# Patient Record
Sex: FEMALE
[De-identification: names, ages, dates, MRNs, and addresses within clinical notes are randomized per-mention and may not be internally consistent; named-entity substitution may affect disease eponyms.]

---
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
## 2024-01-21 PROBLEM — Z23 NEED FOR VACCINATION: Status: RESOLVED | Noted: 2018-07-24 | Resolved: 2024-01-21

## 2024-01-21 PROBLEM — Z23 ENCOUNTER FOR IMMUNIZATION: Status: ACTIVE | Noted: 2024-01-21

## 2024-01-21 PROBLEM — Z00.00 ENCOUNTER FOR PREVENTIVE HEALTH EXAMINATION: Status: ACTIVE | Noted: 2018-06-24

## 2024-01-21 RX ORDER — PROMETHAZINE HYDROCHLORIDE AND DEXTROMETHORPHAN HYDROBROMIDE ORAL SOLUTION 15; 6.25 MG/5ML; MG/5ML
6.25-15 SOLUTION ORAL EVERY 6 HOURS
Qty: 120 | Refills: 0 | Status: COMPLETED | COMMUNITY
Start: 2021-07-14 | End: 2024-01-21

## 2024-01-24 ENCOUNTER — APPOINTMENT (OUTPATIENT)
Dept: PEDIATRICS | Facility: CLINIC | Age: 19
End: 2024-01-24
Payer: COMMERCIAL

## 2024-01-24 VITALS
WEIGHT: 105.5 LBS | SYSTOLIC BLOOD PRESSURE: 118 MMHG | BODY MASS INDEX: 18.46 KG/M2 | HEIGHT: 63.5 IN | DIASTOLIC BLOOD PRESSURE: 80 MMHG

## 2024-01-24 DIAGNOSIS — Z23 ENCOUNTER FOR IMMUNIZATION: ICD-10-CM

## 2024-01-24 DIAGNOSIS — Z00.00 ENCOUNTER FOR GENERAL ADULT MEDICAL EXAMINATION W/OUT ABNORMAL FINDINGS: ICD-10-CM

## 2024-01-24 DIAGNOSIS — Z13.220 ENCOUNTER FOR SCREENING FOR LIPOID DISORDERS: ICD-10-CM

## 2024-01-24 DIAGNOSIS — Z13.0 ENCOUNTER FOR SCREENING FOR DISEASES OF THE BLOOD AND BLOOD-FORMING ORGANS AND CERTAIN DISORDERS INVOLVING THE IMMUNE MECHANISM: ICD-10-CM

## 2024-01-24 DIAGNOSIS — Z13.228 ENCOUNTER FOR SCREENING FOR OTHER METABOLIC DISORDERS: ICD-10-CM

## 2024-01-24 DIAGNOSIS — Z13.29 ENCOUNTER FOR SCREENING FOR OTHER SUSPECTED ENDOCRINE DISORDER: ICD-10-CM

## 2024-01-24 PROCEDURE — 90471 IMMUNIZATION ADMIN: CPT

## 2024-01-24 PROCEDURE — 96127 BRIEF EMOTIONAL/BEHAV ASSMT: CPT

## 2024-01-24 PROCEDURE — 90472 IMMUNIZATION ADMIN EACH ADD: CPT

## 2024-01-24 PROCEDURE — 99395 PREV VISIT EST AGE 18-39: CPT | Mod: 25

## 2024-01-24 PROCEDURE — 90715 TDAP VACCINE 7 YRS/> IM: CPT

## 2024-01-24 PROCEDURE — 90620 MENB-4C VACCINE IM: CPT

## 2024-01-24 NOTE — HISTORY OF PRESENT ILLNESS
[Mother] : mother [Normal] : normal [Eats meals with family] : eats meals with family [Has family members/adults to turn to for help] : has family members/adults to turn to for help [Has friends] : has friends [No] : No cigarette smoke exposure [Uses safety belts/safety equipment] : uses safety belts/safety equipment  [Has ways to cope with stress] : has ways to cope with stress [Displays self-confidence] : displays self-confidence [Sleep Concerns] : no sleep concerns [Has concerns about body or appearance] : does not have concerns about body or appearance [Uses electronic nicotine delivery system] : does not use electronic nicotine delivery system [Uses drugs] : does not use drugs  [Drinks alcohol] : does not drink alcohol [Has problems with sleep] : does not have problems with sleep [Gets depressed, anxious, or irritable/has mood swings] : does not get depressed, anxious, or irritable/has mood swings [Has thought about hurting self or considered suicide] : has not thought about hurting self or considered suicide [FreeTextEntry7] : Patient had COVID recently.  Since then, she has felt dizzy at times.  She was diagnosed with vasovagal syncope in the past.  She has not had any syncope or near syncope at this point

## 2024-01-24 NOTE — PHYSICAL EXAM
[Alert] : alert [No Acute Distress] : no acute distress [Normocephalic] : normocephalic [EOMI Bilateral] : EOMI bilateral [Clear tympanic membranes with bony landmarks and light reflex present bilaterally] : clear tympanic membranes with bony landmarks and light reflex present bilaterally  [Pink Nasal Mucosa] : pink nasal mucosa [Nonerythematous Oropharynx] : nonerythematous oropharynx [Supple, full passive range of motion] : supple, full passive range of motion [No Palpable Masses] : no palpable masses [Clear to Auscultation Bilaterally] : clear to auscultation bilaterally [Regular Rate and Rhythm] : regular rate and rhythm [Normal S1, S2 audible] : normal S1, S2 audible [No Murmurs] : no murmurs [+2 Femoral Pulses] : +2 femoral pulses [Soft] : soft [NonTender] : non tender [Non Distended] : non distended [No Hepatomegaly] : no hepatomegaly [No Splenomegaly] : no splenomegaly [No Abnormal Lymph Nodes Palpated] : no abnormal lymph nodes palpated [Normal Muscle Tone] : normal muscle tone [No Gait Asymmetry] : no gait asymmetry [Straight] : straight [Cranial Nerves Grossly Intact] : cranial nerves grossly intact [No Rash or Lesions] : no rash or lesions [Robb: ____] : Robb [unfilled] [de-identified] : Evaluation for scoliosis:  No scoliosis on exam, ( Normal Morrell Forward Bend Test).

## 2024-05-31 ENCOUNTER — APPOINTMENT (OUTPATIENT)
Dept: PEDIATRICS | Facility: CLINIC | Age: 19
End: 2024-05-31
Payer: COMMERCIAL

## 2024-05-31 VITALS — DIASTOLIC BLOOD PRESSURE: 76 MMHG | SYSTOLIC BLOOD PRESSURE: 120 MMHG | WEIGHT: 108 LBS

## 2024-05-31 DIAGNOSIS — F41.9 ANXIETY DISORDER, UNSPECIFIED: ICD-10-CM

## 2024-05-31 PROCEDURE — 99214 OFFICE O/P EST MOD 30 MIN: CPT

## 2024-05-31 PROCEDURE — G2211 COMPLEX E/M VISIT ADD ON: CPT | Mod: NC,1L

## 2024-05-31 NOTE — HISTORY OF PRESENT ILLNESS
[FreeTextEntry6] : The patient is here to talk about her anxiety.  Always been anxious but recently it is significantly worse.  She had trouble in college and transferred back to a local school recently.  According to her this was a good move and she is doing well in school.  She has a job now working for a political candidate.  It is a very high stress job with her being asked to answer her phone 24 hours a day when called by the candidate.  The job at this time is giving her the majority of her stress.  Over the years, she has received counseling, but not recently.

## 2024-05-31 NOTE — RESULTS/DATA
[TextEntry] : In addition to the DONAVAN-7, the patient did fill out a PHQ A.  Due to her age, I was unable to enter this through E calcs, but it should be scanned on the chart.  The form did not show depression.

## 2024-05-31 NOTE — PLAN
[TextEntry] : I mentioned to the patient that the 2 modes of treatment for this is therapy and medication.  I told her that medication alone would not be the answer, but may be helpful.  The patient did not want to try any medication at this time.  She did agree to therapy.  I also felt that the stress of the job is overwhelming for her right now and that she would probably be better off changing her employment for the summer.  The income she earns from the job is not critical to her.

## 2024-06-14 ENCOUNTER — APPOINTMENT (OUTPATIENT)
Dept: PEDIATRICS | Facility: CLINIC | Age: 19
End: 2024-06-14

## 2024-07-02 RX ORDER — ESCITALOPRAM OXALATE 10 MG/1
10 TABLET ORAL
Qty: 30 | Refills: 0 | Status: ACTIVE | COMMUNITY
Start: 2024-07-02 | End: 1900-01-01

## 2024-11-12 ENCOUNTER — NON-APPOINTMENT (OUTPATIENT)
Age: 19
End: 2024-11-12